# Patient Record
Sex: MALE | URBAN - METROPOLITAN AREA
[De-identification: names, ages, dates, MRNs, and addresses within clinical notes are randomized per-mention and may not be internally consistent; named-entity substitution may affect disease eponyms.]

---

## 2021-08-19 ENCOUNTER — OFFICE VISIT (OUTPATIENT)
Dept: URBAN - METROPOLITAN AREA CLINIC 71 | Facility: CLINIC | Age: 61
End: 2021-08-19
Payer: COMMERCIAL

## 2021-08-19 DIAGNOSIS — H25.13 AGE-RELATED NUCLEAR CATARACT, BILATERAL: ICD-10-CM

## 2021-08-19 DIAGNOSIS — H40.053 OCULAR HYPERTENSION, BILATERAL: Primary | ICD-10-CM

## 2021-08-19 PROCEDURE — 92133 CPTRZD OPH DX IMG PST SGM ON: CPT | Performed by: OPHTHALMOLOGY

## 2021-08-19 PROCEDURE — 92004 COMPRE OPH EXAM NEW PT 1/>: CPT | Performed by: OPHTHALMOLOGY

## 2021-08-19 ASSESSMENT — KERATOMETRY
OS: 43.25
OD: 43.63

## 2021-08-19 ASSESSMENT — INTRAOCULAR PRESSURE
OD: 22
OS: 26

## 2021-08-19 NOTE — IMPRESSION/PLAN
Impression: Ocular hypertension, bilateral: H40.053. Stable. Thick pachys (-8). IOPs doing well without drops. Plan: Discussed. No treatment necessary at this time. Will continue to monitor. Contact office if any change in vision.